# Patient Record
Sex: FEMALE | Race: WHITE | NOT HISPANIC OR LATINO | URBAN - METROPOLITAN AREA
[De-identification: names, ages, dates, MRNs, and addresses within clinical notes are randomized per-mention and may not be internally consistent; named-entity substitution may affect disease eponyms.]

---

## 2017-10-04 ENCOUNTER — EMERGENCY (EMERGENCY)
Facility: HOSPITAL | Age: 22
LOS: 1 days | End: 2017-10-04
Payer: COMMERCIAL

## 2017-10-04 PROCEDURE — 73130 X-RAY EXAM OF HAND: CPT | Mod: 26,RT

## 2017-10-04 PROCEDURE — 99283 EMERGENCY DEPT VISIT LOW MDM: CPT

## 2018-05-01 ENCOUNTER — OUTPATIENT (OUTPATIENT)
Dept: OUTPATIENT SERVICES | Facility: HOSPITAL | Age: 23
LOS: 1 days | End: 2018-05-01

## 2018-05-04 ENCOUNTER — EMERGENCY (EMERGENCY)
Facility: HOSPITAL | Age: 23
LOS: 1 days | Discharge: HOME | End: 2018-05-04
Attending: EMERGENCY MEDICINE
Payer: COMMERCIAL

## 2018-05-04 VITALS
OXYGEN SATURATION: 98 % | WEIGHT: 104.94 LBS | HEART RATE: 88 BPM | SYSTOLIC BLOOD PRESSURE: 128 MMHG | HEIGHT: 64 IN | DIASTOLIC BLOOD PRESSURE: 74 MMHG | TEMPERATURE: 98 F | RESPIRATION RATE: 16 BRPM

## 2018-05-04 PROCEDURE — 99282 EMERGENCY DEPT VISIT SF MDM: CPT

## 2018-05-04 PROCEDURE — 99283 EMERGENCY DEPT VISIT LOW MDM: CPT

## 2018-05-04 NOTE — ED PROVIDER NOTE - OBJECTIVE STATEMENT
22 y/o F pt presents to ED BIBA with [facility] after bystanders called them because pt was found sleeping. Per EMS upon arrival pt was awake. Pt admits to using heroin and drinking last night, none this morning. Pt asking for Suboxone. Denies chest pain, SOB, abdominal pain, nausea, vomiting, headache. No further complaints at this time.

## 2018-05-04 NOTE — ED ADULT TRIAGE NOTE - CHIEF COMPLAINT QUOTE
found with 4-4 locos today. Denies drinking today. Denies drugs. Was found sitting outside apartment complex where she does not live. States she is homeless.

## 2018-05-04 NOTE — ED PROVIDER NOTE - PROGRESS NOTE DETAILS
pt is refusing blood work. patient awake and alert; ambulatory in ED; demanding immediate discharge  cursing and screaming

## 2018-05-04 NOTE — SBIRT NOTE. - NSSBIRTFULLSCREEN_GEN_A_ED_FT
Naloxone Rescue Kit dispensed: Pt was educated about Naloxone and trained on how to assemble and utilize the kit. [facility]-796

## 2018-05-08 DIAGNOSIS — R69 ILLNESS, UNSPECIFIED: ICD-10-CM

## 2018-10-01 ENCOUNTER — OUTPATIENT (OUTPATIENT)
Dept: OUTPATIENT SERVICES | Facility: HOSPITAL | Age: 23
LOS: 1 days | End: 2018-10-01
Payer: MEDICAID

## 2018-10-01 PROCEDURE — G9001: CPT

## 2018-10-12 DIAGNOSIS — Z71.89 OTHER SPECIFIED COUNSELING: ICD-10-CM

## 2021-05-01 ENCOUNTER — OUTPATIENT (OUTPATIENT)
Dept: OUTPATIENT SERVICES | Facility: HOSPITAL | Age: 26
LOS: 1 days | End: 2021-05-01
Payer: MEDICAID

## 2021-05-01 PROCEDURE — G9005: CPT

## 2021-05-09 ENCOUNTER — EMERGENCY (EMERGENCY)
Facility: HOSPITAL | Age: 26
LOS: 1 days | Discharge: HOME | End: 2021-05-09
Admitting: EMERGENCY MEDICINE
Payer: SELF-PAY

## 2021-05-09 VITALS
WEIGHT: 164.91 LBS | SYSTOLIC BLOOD PRESSURE: 120 MMHG | DIASTOLIC BLOOD PRESSURE: 90 MMHG | HEIGHT: 66 IN | RESPIRATION RATE: 17 BRPM | OXYGEN SATURATION: 99 % | TEMPERATURE: 98 F | HEART RATE: 106 BPM

## 2021-05-09 DIAGNOSIS — F13.10 SEDATIVE, HYPNOTIC OR ANXIOLYTIC ABUSE, UNCOMPLICATED: ICD-10-CM

## 2021-05-09 DIAGNOSIS — R41.82 ALTERED MENTAL STATUS, UNSPECIFIED: ICD-10-CM

## 2021-05-09 PROCEDURE — 99284 EMERGENCY DEPT VISIT MOD MDM: CPT

## 2021-05-09 RX ORDER — DIPHENHYDRAMINE HCL 50 MG
50 CAPSULE ORAL ONCE
Refills: 0 | Status: DISCONTINUED | OUTPATIENT
Start: 2021-05-09 | End: 2021-05-09

## 2021-05-09 RX ORDER — HALOPERIDOL DECANOATE 100 MG/ML
5 INJECTION INTRAMUSCULAR ONCE
Refills: 0 | Status: DISCONTINUED | OUTPATIENT
Start: 2021-05-09 | End: 2021-05-09

## 2021-05-09 NOTE — ED PROVIDER NOTE - CLINICAL SUMMARY MEDICAL DECISION MAKING FREE TEXT BOX
Patient is BIB EMS for suspected drug intoxication. History and ROS limited due to altered state.  No evidence of head or extremity trauma.  No vital sign derangements.  Abdomen not distended.  Will observe to clinical sobriety.

## 2021-05-09 NOTE — ED ADULT TRIAGE NOTE - CHIEF COMPLAINT QUOTE
patient BIBA from [facility] authority [address]. patient admits to xanax and ETOH use prior to arrival. awake during triage

## 2021-05-09 NOTE — ED PROVIDER NOTE - OBJECTIVE STATEMENT
27 y/o F with unknown PMHx is BIB EMS for AMS s/p drug intoxication. Pt admitted to using "bars of Xanax" earlier today. Pt offers no medical complaints at this time. Unable to obtain remainder of HPI due to her current status.

## 2021-05-09 NOTE — ED ADULT NURSE REASSESSMENT NOTE - NS ED NURSE REASSESS COMMENT FT1
Pt noted to have multiple medication in bag at this time. Pt speech slurred and slow. Pt noted OOB with slow steady gait with standby assist. [address] notified of medications in bag and to DC Haldol. Will continue to monitor. Pt noted to have multiple psych medication in bag at this time. Pt speech slurred and slow. Pt noted OOB with slow steady gait with standby assist. [address] notified of medications in bag and to DC Haldol. Will continue to monitor.

## 2021-05-09 NOTE — ED PROVIDER NOTE - NSFOLLOWUPINSTRUCTIONS_ED_ALL_ED_FT
Polysubstance Abuse    WHAT YOU NEED TO KNOW:    Polysubstance abuse is the abuse of 2 or more drugs that cause impairment or distress. Examples include alcohol, nicotine, marijuana, cocaine, heroin, methamphetamine, hallucinogens such as mushrooms, or inhalants such as paint thinner. Prescribed medicines, such as opioids for pain or benzodiazepines for anxiety, are also commonly abused.    DISCHARGE INSTRUCTIONS:    Call 911 for any of the following:     You feel you might harm yourself or others.         Return to the emergency department if:     You have a seizure.       You have chest pain and your heart is beating faster than usual.       You have new shortness of breath.       You are dizzy and lightheaded.     Contact your healthcare provider or therapist if:     You are using drugs and think you are pregnant.       You have withdrawal symptoms and want to start using drugs again.       You have questions or concerns about your condition or care.     Risks of polysubstance abuse:     Drug dependence is when you continue to use drugs, even when you know the risks. Polysubstance abuse can damage your heart, brain, lungs, liver, and gastrointestinal tract. You continue even when it causes problems with work, school, or relationships. You may have difficulty finding or keeping a job because of your drug dependence.       Drug tolerance is when you need to use more drugs, or use them more often, to get the effects you want. You may not be able to stop using the drugs. When you try to stop, you may have withdrawal symptoms and strong cravings for the drugs.      Drug overdose can occur when you take more drugs than your body can handle. This may be a small amount or a large amount. You can lose consciousness or have a seizure or stroke. Your heart can stop beating, or you can stop breathing. You may die from a drug overdose.     Medicines:     Withdrawal medicines may be given according to the types of drugs you are abusing. Withdrawal from drugs can cause serious, life-threatening side effects. Certain medicines can help decrease your withdrawal symptoms and your desire for the drug. Ask for more information about the withdrawal medicines you may need.       Mood stabilizers may be given to help prevent or treat depression or anxiety caused by drug abuse and withdrawal.       Take your medicine as directed. Contact your healthcare provider if you think your medicine is not helping or if you have side effects. Tell him or her if you are allergic to any medicine. Keep a list of the medicines, vitamins, and herbs you take. Include the amounts, and when and why you take them. Bring the list or the pill bottles to follow-up visits. Carry your medicine list with you in case of an emergency.    Follow up with your healthcare provider as directed: You may be referred to a specialist to treat health conditions caused by your drug use. This includes mental health, heart, or lung specialists. Write down your questions so you remember to ask them during your visits.     Therapy: You may need therapy and support to stop using drugs:     Cognitive and behavioral therapy helps you change your thinking and behavior. It can help you develop plans to avoid the situations that make you want to use drugs. It also helps you cope with the feelings of wanting to use drugs. You may have individual or group therapy.       Contingency management helps you set drug-free goals with a therapist. You will decide ways to celebrate your success when you reach a goal.       Family therapy and support groups allow you and your family members to talk to and be encouraged by other people affected by drug abuse. You and your family members may attend together or separately. Ask your healthcare provider for information about programs in your area.     How polysubstance abuse affects unborn or  babies:     If you are pregnant or get pregnant while using drugs, you may have a miscarriage or give birth early. Your baby may be born addicted to the drugs.      Do not breastfeed your baby if you use drugs. Drugs pass from your bloodstream into your breast milk and affect your baby's health. Talk with your healthcare provider if you are using drugs and breastfeeding.    Interested in discussing options to reduce your alcohol or drug use?      [facility]: [phone]   [facility] Substance Abuse Services: [phone], option #2   Methadone Maintenance & Ambulatory Opiate Detox: [phone]  [facility]: [phone]  [facility]: [phone]  [facility]: [phone]    [facility]: [phone], option #2   [facility]: [phone]    [facility]: [phone]    [facility] Central Intake: [phone]  [facility] Chemical Dependency/Ancillary Withdrawal: [phone]  [facility] Methadone Maintenance: [phone]    [facility]: [phone]  [facility] Addiction Treatment Services: [phone]    [facility]Line: 9-491-5-HOPENY    [facility] Office of Alcoholism and Substance Abuse Services (OASAS): https://www.oasas.[place].gov/providerdirectory/  [facility] for Addiction Services and Psychotherapy Interventions Research (CASPIR)  www.[facility]org     Interested in discussing options to reduce your tobacco use?    [facility] for Tobacco Control:  [phone]  [facility] QUITLINE: 6-221-BL-QUITS ([phone])    Interested in learning more about substance use?      http://rethinkingdrinking.niaaa.nih.gov   https://www.drugabuse.gov/patients-families     Learn more about opioid overdose prevention programs in [facility]:  http://www.health.[place].gov/diseases/aids/general/opioid_overdose_prevention/

## 2021-05-09 NOTE — ED ADULT NURSE NOTE - OBJECTIVE STATEMENT
Pt BIBA from [place] [facility] c/o AMS.  Pt endorses use of ETOH and xanax today.  Denies head injury.  Pt tearful.  A&Ox3.  Pt denies intent to cause self-harm.  Pt in full view of nursing station w/ bed alarm in place.  Pt pending sobriety.

## 2021-05-09 NOTE — ED ADULT NURSE NOTE - CHIEF COMPLAINT QUOTE
patient BIBA from [facility] authority [address]. patient admits to xanax and ETOH use prior to arrival. awake during triage

## 2021-05-09 NOTE — ED ADULT NURSE NOTE - NSIMPLEMENTINTERV_GEN_ALL_ED
Implemented All Fall Risk Interventions:  [place] to call system. Call bell, personal items and telephone within reach. Instruct patient to call for assistance. Room bathroom lighting operational. Non-slip footwear when patient is off stretcher. Physically safe environment: no spills, clutter or unnecessary equipment. Stretcher in lowest position, wheels locked, appropriate side rails in place. Provide visual cue, wrist band, yellow gown, etc. Monitor gait and stability. Monitor for mental status changes and reorient to person, place, and time. Review medications for side effects contributing to fall risk. Reinforce activity limits and safety measures with patient and family.

## 2021-05-09 NOTE — ED PROVIDER NOTE - PATIENT PORTAL LINK FT
You can access the FollowMyHealth Patient Portal offered by [facility] by registering at the following website: [email or web address]. By joining [employer]’s FollowMyHealth portal, you will also be able to view your health information using other applications (apps) compatible with our system.

## 2021-05-09 NOTE — ED PROVIDER NOTE - PROGRESS NOTE DETAILS
pt is somewhat more awake but continues to have slurred speech and unsteady gait. she also admits now to take 800mg seroquel pta because she couldn't sleep. contacted pt's grandmother who is unable to come pick the patient up. attempted verbal cues to keep patient in bed but she is not cooperative. noted to have multiple psych medications in her bag including seroquel, haldol, hydroxyzine, buproprion. pt notes she has not yet taken any of her haldol today. will obtain ekg and give benadryl to help patient sleep. pt is somewhat more awake but continues to have slurred speech and unsteady gait. she also admits now to take 800mg seroquel pta because she couldn't sleep. contacted pt's grandmother who is unable to come pick the patient up but describes h/o heroin use. attempted verbal cues to keep patient in bed but she is not cooperative. noted to have multiple psych medications in her bag including seroquel, haldol, hydroxyzine, buproprion. pt notes she has not yet taken any of her haldol today. will obtain ekg and give benadryl to help patient sleep. pt is much more awake, speaking clearly, did not get benadryl and is feeling better. requesting discharge. ambulating with steady gait. will d/c. pt refused ekg. she is much more awake, speaking clearly, did not get benadryl and is feeling better. requesting discharge. ambulating with steady gait. will d/c.

## 2021-05-26 DIAGNOSIS — Z71.89 OTHER SPECIFIED COUNSELING: ICD-10-CM

## 2021-07-09 ENCOUNTER — EMERGENCY (EMERGENCY)
Facility: HOSPITAL | Age: 26
LOS: 1 days | Discharge: HOME | End: 2021-07-09
Attending: EMERGENCY MEDICINE
Payer: MEDICAID

## 2021-07-09 VITALS
RESPIRATION RATE: 20 BRPM | HEIGHT: 64 IN | WEIGHT: 169.98 LBS | OXYGEN SATURATION: 95 % | TEMPERATURE: 99 F | SYSTOLIC BLOOD PRESSURE: 133 MMHG | DIASTOLIC BLOOD PRESSURE: 82 MMHG | HEART RATE: 109 BPM

## 2021-07-09 LAB — HCG UR QL: NEGATIVE — SIGNIFICANT CHANGE UP

## 2021-07-09 PROCEDURE — 94640 AIRWAY INHALATION TREATMENT: CPT

## 2021-07-09 PROCEDURE — 99284 EMERGENCY DEPT VISIT MOD MDM: CPT

## 2021-07-09 PROCEDURE — 99283 EMERGENCY DEPT VISIT LOW MDM: CPT | Mod: 25

## 2021-07-09 PROCEDURE — 81025 URINE PREGNANCY TEST: CPT

## 2021-07-09 RX ORDER — ONDANSETRON 8 MG/1
1 TABLET, FILM COATED ORAL
Qty: 9 | Refills: 0
Start: 2021-07-09 | End: 2021-07-11

## 2021-07-09 RX ORDER — ALBUTEROL 90 UG/1
3 AEROSOL, METERED ORAL
Qty: 1 | Refills: 0
Start: 2021-07-09 | End: 2021-07-15

## 2021-07-09 RX ORDER — IPRATROPIUM/ALBUTEROL SULFATE 18-103MCG
3 AEROSOL WITH ADAPTER (GRAM) INHALATION ONCE
Refills: 0 | Status: COMPLETED | OUTPATIENT
Start: 2021-07-09 | End: 2021-07-09

## 2021-07-09 RX ORDER — ONDANSETRON 8 MG/1
8 TABLET, FILM COATED ORAL ONCE
Refills: 0 | Status: DISCONTINUED | OUTPATIENT
Start: 2021-07-09 | End: 2021-07-14

## 2021-07-09 RX ADMIN — Medication 3 MILLILITER(S): at 17:08

## 2021-07-09 RX ADMIN — Medication 50 MILLIGRAM(S): at 17:08

## 2021-07-09 NOTE — ED STATDOCS - NSFOLLOWUPCLINICS_GEN_ALL_ED_FT
[address] [place] [address]  Phone: [phone]  Fax:      [id number] [place] [address]  Phone: [phone]  Fax:

## 2021-07-09 NOTE — ED STATDOCS - ATTENDING CONTRIBUTION TO CARE
I, Akila Tobar, performed the initial face to face bedside interview with this patient regarding history of present illness, review of symptoms and relevant past medical, social and family history.  I completed an independent physical examination.  I was the initial provider who evaluated this patient. I have signed out the follow up of any pending tests (i.e. labs, radiological studies) to the ACP.  I have communicated the patient’s plan of care and disposition with the ACP.  The history, relevant review of systems, past medical and surgical history, medical decision making, and physical examination was documented by the scribe in my presence and I attest to the accuracy of the documentation.

## 2021-07-09 NOTE — ED STATDOCS - PROGRESS NOTE DETAILS
PT evaluated by intake physician. HPI/PE/ROS as noted above. Will follow up plan per intake physician. Pt feeling better after neb treatment. Will dc with meds and f/u. PT evaluated by intake physician. HPI/PE/ROS as noted above. Will follow up plan per intake physician. Pt feeling better after neb treatment. does not want to wait for pregnancy test results.  Will dc with meds and f/u.

## 2021-07-09 NOTE — ED STATDOCS - OBJECTIVE STATEMENT
27 y/o female with PMHx of c/o SOB. Pt went to [facility] last week had CT last week showing pneumonia given, Doxycycline to no relief. Pt states she was on another antibiotics prior to this due to STI. Pt unable to tolerate PO, endorses vomiting. Pt states SOB unchanged since last week. Pt has asthma has a baby, used Mothers albuterol nebulizer to some relief. Pt not vaccinated against covid. Pt denies fever. Pt endorses occasional marijuana smoking, none since onset of symptoms.

## 2021-07-09 NOTE — ED STATDOCS - PATIENT PORTAL LINK FT
You can access the FollowMyHealth Patient Portal offered by [facility] by registering at the following website: [email or web address]. By joining [employer]’s FollowMyHealth portal, you will also be able to view your health information using other applications (apps) compatible with our system. You can access the FollowMyHealth Patient Portal offered by [facility] by registering at the following website: [email or web address]. By joining [employer]’s FollowMyHealth portal, you will also be able to view your health information using other applications (apps) compatible with our system.

## 2023-10-09 NOTE — ED PROVIDER NOTE - NS ED MD EM SELECTION
[facility] Endocrinology  Endocrinology  [address] [place] [address]  Phone: [phone]  Fax:      [phone]